# Patient Record
Sex: FEMALE | Race: WHITE | NOT HISPANIC OR LATINO
[De-identification: names, ages, dates, MRNs, and addresses within clinical notes are randomized per-mention and may not be internally consistent; named-entity substitution may affect disease eponyms.]

---

## 2018-07-11 ENCOUNTER — APPOINTMENT (OUTPATIENT)
Dept: VASCULAR SURGERY | Facility: CLINIC | Age: 83
End: 2018-07-11
Payer: MEDICARE

## 2018-07-11 VITALS — HEIGHT: 64 IN | BODY MASS INDEX: 23.22 KG/M2 | WEIGHT: 136 LBS

## 2018-07-11 DIAGNOSIS — Z78.9 OTHER SPECIFIED HEALTH STATUS: ICD-10-CM

## 2018-07-11 DIAGNOSIS — Z80.9 FAMILY HISTORY OF MALIGNANT NEOPLASM, UNSPECIFIED: ICD-10-CM

## 2018-07-11 DIAGNOSIS — Z86.39 PERSONAL HISTORY OF OTHER ENDOCRINE, NUTRITIONAL AND METABOLIC DISEASE: ICD-10-CM

## 2018-07-11 DIAGNOSIS — Z82.61 FAMILY HISTORY OF ARTHRITIS: ICD-10-CM

## 2018-07-11 PROCEDURE — 99203 OFFICE O/P NEW LOW 30 MIN: CPT

## 2018-07-11 PROCEDURE — 93971 EXTREMITY STUDY: CPT

## 2018-08-22 ENCOUNTER — APPOINTMENT (OUTPATIENT)
Dept: VASCULAR SURGERY | Facility: CLINIC | Age: 83
End: 2018-08-22
Payer: MEDICARE

## 2018-08-22 PROCEDURE — 99213 OFFICE O/P EST LOW 20 MIN: CPT

## 2019-03-06 ENCOUNTER — RECORD ABSTRACTING (OUTPATIENT)
Age: 84
End: 2019-03-06

## 2019-03-06 DIAGNOSIS — Z82.49 FAMILY HISTORY OF ISCHEMIC HEART DISEASE AND OTHER DISEASES OF THE CIRCULATORY SYSTEM: ICD-10-CM

## 2019-03-06 DIAGNOSIS — Z87.828 PERSONAL HISTORY OF OTHER (HEALED) PHYSICAL INJURY AND TRAUMA: ICD-10-CM

## 2019-03-06 DIAGNOSIS — M50.222 OTHER CERVICAL DISC DISPLACEMENT AT C5-C6 LEVEL: ICD-10-CM

## 2019-03-06 DIAGNOSIS — Z86.010 PERSONAL HISTORY OF COLONIC POLYPS: ICD-10-CM

## 2019-03-06 LAB — CYTOLOGY CVX/VAG DOC THIN PREP: NORMAL

## 2019-03-06 RX ORDER — ASPIRIN 325 MG/1
TABLET, FILM COATED ORAL
Refills: 0 | Status: ACTIVE | COMMUNITY

## 2019-04-16 ENCOUNTER — APPOINTMENT (OUTPATIENT)
Dept: CARDIOLOGY | Facility: CLINIC | Age: 84
End: 2019-04-16

## 2019-06-24 DIAGNOSIS — K21.9 GASTRO-ESOPHAGEAL REFLUX DISEASE W/OUT ESOPHAGITIS: ICD-10-CM

## 2019-06-24 DIAGNOSIS — K57.30 DIVERTICULOSIS OF LARGE INTESTINE W/OUT PERFORATION OR ABSCESS W/OUT BLEEDING: ICD-10-CM

## 2019-06-24 DIAGNOSIS — K29.30 CHRONIC SUPERFICIAL GASTRITIS W/OUT BLEEDING: ICD-10-CM

## 2019-06-24 DIAGNOSIS — E04.2 NONTOXIC MULTINODULAR GOITER: ICD-10-CM

## 2019-06-24 DIAGNOSIS — M54.12 RADICULOPATHY, CERVICAL REGION: ICD-10-CM

## 2019-06-24 DIAGNOSIS — K64.8 OTHER HEMORRHOIDS: ICD-10-CM

## 2019-06-24 DIAGNOSIS — G47.00 INSOMNIA, UNSPECIFIED: ICD-10-CM

## 2019-06-24 DIAGNOSIS — N81.10 CYSTOCELE, UNSPECIFIED: ICD-10-CM

## 2019-06-24 DIAGNOSIS — F41.1 GENERALIZED ANXIETY DISORDER: ICD-10-CM

## 2019-06-24 DIAGNOSIS — E07.9 DISORDER OF THYROID, UNSPECIFIED: ICD-10-CM

## 2019-06-24 DIAGNOSIS — M54.16 RADICULOPATHY, LUMBAR REGION: ICD-10-CM

## 2019-06-24 DIAGNOSIS — K76.89 OTHER SPECIFIED DISEASES OF LIVER: ICD-10-CM

## 2019-06-25 ENCOUNTER — APPOINTMENT (OUTPATIENT)
Dept: CARDIOLOGY | Facility: CLINIC | Age: 84
End: 2019-06-25
Payer: MEDICARE

## 2019-06-25 ENCOUNTER — NON-APPOINTMENT (OUTPATIENT)
Age: 84
End: 2019-06-25

## 2019-06-25 VITALS
DIASTOLIC BLOOD PRESSURE: 70 MMHG | SYSTOLIC BLOOD PRESSURE: 120 MMHG | HEIGHT: 72 IN | HEART RATE: 60 BPM | WEIGHT: 140 LBS | BODY MASS INDEX: 18.96 KG/M2

## 2019-06-25 DIAGNOSIS — I63.81 OTHER CEREBRAL INFARCTION DUE TO OCCLUSION OR STENOSIS OF SMALL ARTERY: ICD-10-CM

## 2019-06-25 DIAGNOSIS — I87.2 VENOUS INSUFFICIENCY (CHRONIC) (PERIPHERAL): ICD-10-CM

## 2019-06-25 DIAGNOSIS — R07.9 CHEST PAIN, UNSPECIFIED: ICD-10-CM

## 2019-06-25 DIAGNOSIS — R06.09 OTHER FORMS OF DYSPNEA: ICD-10-CM

## 2019-06-25 DIAGNOSIS — Z86.79 PERSONAL HISTORY OF OTHER DISEASES OF THE CIRCULATORY SYSTEM: ICD-10-CM

## 2019-06-25 DIAGNOSIS — Z87.898 PERSONAL HISTORY OF OTHER SPECIFIED CONDITIONS: ICD-10-CM

## 2019-06-25 DIAGNOSIS — E78.5 HYPERLIPIDEMIA, UNSPECIFIED: ICD-10-CM

## 2019-06-25 PROCEDURE — 99214 OFFICE O/P EST MOD 30 MIN: CPT

## 2019-06-25 PROCEDURE — 93000 ELECTROCARDIOGRAM COMPLETE: CPT

## 2019-06-25 NOTE — HISTORY OF PRESENT ILLNESS
[FreeTextEntry1] : Ms Burdick has been followed here since august 2018 for chest pain, a stress test was negative. There is a history of cva 1995 and  cerebellar lacunar infarcts in 2002. Since last visit she  has not been hospitalized, she has now developed sharp pains in her bones and muscles. She has slight  palpitations , no syncope.She complains that her varicosities are getting larger. She gets a discomfort under her left breast twice a week at rest usually after eating when she gets abdominal bloating.  She gets dyspnea with exertion, walks every night for 35 minutes by the end of the walk when it is hot she feels the shortness of breath.

## 2019-12-17 ENCOUNTER — APPOINTMENT (OUTPATIENT)
Dept: CARDIOLOGY | Facility: CLINIC | Age: 84
End: 2019-12-17

## 2020-10-10 ENCOUNTER — RESULT REVIEW (OUTPATIENT)
Age: 85
End: 2020-10-10

## 2020-10-12 ENCOUNTER — RESULT REVIEW (OUTPATIENT)
Age: 85
End: 2020-10-12

## 2021-05-28 ENCOUNTER — APPOINTMENT (OUTPATIENT)
Dept: GASTROENTEROLOGY | Facility: CLINIC | Age: 86
End: 2021-05-28
Payer: MEDICARE

## 2021-05-28 VITALS
BODY MASS INDEX: 23.32 KG/M2 | RESPIRATION RATE: 18 BRPM | TEMPERATURE: 97.5 F | HEIGHT: 65 IN | OXYGEN SATURATION: 97 % | DIASTOLIC BLOOD PRESSURE: 76 MMHG | HEART RATE: 76 BPM | SYSTOLIC BLOOD PRESSURE: 114 MMHG | WEIGHT: 140 LBS

## 2021-05-28 PROCEDURE — 99204 OFFICE O/P NEW MOD 45 MIN: CPT

## 2021-05-28 NOTE — HISTORY OF PRESENT ILLNESS
[FreeTextEntry1] : 85f HLD, HTN, anxiety, CVA '95 (no residua), here for 7d illness - diarrhea postprandially.  She felt upper abd pain w/ cramping as well 2d ago - went to ER:\par 5/26/21: CBC, CMET, lipase normal; CT oc/ic normal\par She still has symptoms- postprandial only - about 30-45 min after any meal.  No diarrhea at night.  3x/d.  No bleeding, fever, travel, recent abx.  Usually tends toward constipation.  No n/v.  \par \par 2013 Colonscopy - [facility] - change in BM, constipation - polyp \par 2014 egd - [facility] - abd pain - hiatal hernia, duodenitis, gastritis\par 2/2016 colonoscopy- [facility] -normal \par \par Soc:  no tobacco or significant EtOH\par FHx: no FHx GI malignancy or IBD\par \par ROS:\par Constitutional:: no weight loss, fevers\par ENT: no deafness\par Eyes: not blind\par Neck: no LN\par Chest: no dyspnea/cough\par Cardiac: no chest pain\par Vascular: no leg swelling\par GI: no abdominal pain, nausea, vomiting, diarrhea, constipation, rectal bleeding, dysphagia, melena unless otherwise noted in HPI\par : no dysuria, dark urine\par Skin: no rashes, jaundice\par Heme: no bleeding\par Endocrine: no DM unless otherwise stated in HPI\par \par Px: (VS noted below)\par General: NAD\par Eyes: anicteric\par Oropharynx:  clear\par Neck: no LN\par Chest: normal respiratory effort\par CVS: regular\par Abd: soft, NT, ND, +BS, no HSM\par Ext: no atrophy\par Neuro: grossly nonfocal\par \par Labs/imaging/prior endoscopic results reviewed to the extent available and noted in HPI\par

## 2021-05-28 NOTE — CONSULT LETTER
[FreeTextEntry1] : Dear Dr. SELENA BUSH ,\par \par I had the pleasure of evaluating your patient,  CANDACE BAE.\par \par Please refer to my note below.\par \par Thank you very much for allowing me to participate in the care of this patient.  If you have any questions, please do not hesitate to contact me.\par \par Sincerely, \par \par Humble Heath MD\par

## 2021-05-28 NOTE — ASSESSMENT
[FreeTextEntry1] : diarrhea - will send stool testing, but possibly infx/post-infx IBS, no bleeding or colitis on CT.  Liberalized diet, but emphasis on fluids and low lactose for now.  F/u 1 wk if sx persist.\par \par Colon cancer screening - colonoscopy due now, but at tail end of screening range.  Declined colonoscopy or cologuard.\par \par PMD/consultation/hospital notes and Labs/imaging/prior endoscopic results reviewed to extent noted in HPI; and, if procedure code billed on this visit for lab draw, this serves to signify that labs were drawn here in this office.\par \par \par

## 2021-05-28 NOTE — REASON FOR VISIT
[Consultation] : a consultation visit [FreeTextEntry1] : Kindly asked by Dr. Griffin to consult and evaluate patient for  diarrhea                  \par A copy of this note is being sent to physician requesting consultation.

## 2021-06-01 LAB
BACTERIA STL CULT: NORMAL
C DIFF TOX GENS STL QL NAA+PROBE: NORMAL
CDIFF BY PCR: NOT DETECTED

## 2021-06-02 LAB — DEPRECATED O AND P PREP STL: NORMAL

## 2021-06-03 ENCOUNTER — NON-APPOINTMENT (OUTPATIENT)
Age: 86
End: 2021-06-03

## 2021-06-03 ENCOUNTER — APPOINTMENT (OUTPATIENT)
Dept: GASTROENTEROLOGY | Facility: CLINIC | Age: 86
End: 2021-06-03
Payer: MEDICARE

## 2021-06-03 VITALS
TEMPERATURE: 96.8 F | OXYGEN SATURATION: 98 % | WEIGHT: 140 LBS | HEART RATE: 66 BPM | SYSTOLIC BLOOD PRESSURE: 118 MMHG | HEIGHT: 65 IN | DIASTOLIC BLOOD PRESSURE: 76 MMHG | RESPIRATION RATE: 18 BRPM | BODY MASS INDEX: 23.32 KG/M2

## 2021-06-03 DIAGNOSIS — R19.7 DIARRHEA, UNSPECIFIED: ICD-10-CM

## 2021-06-03 PROCEDURE — 99213 OFFICE O/P EST LOW 20 MIN: CPT

## 2021-06-03 NOTE — HISTORY OF PRESENT ILLNESS
[FreeTextEntry1] : 85f HLD, HTN, anxiety, CVA '95 (no residua), here forf/u - diarrhea postprandially.  She felt upper abd pain w/ cramping as well at onset - went to ER:\par 5/26/21: CBC, CMET, lipase normal; CT oc/ic normal\par \par BMs back to normal (constipation) - some tenesmus still.  Vague upper abd pain persists, but still waning.  No wt loss/n/v.  No fevers.  No BRBPR/melena.  No NSAID use.\par \par Stool studies normal - c.diff, op, cx.  \par \par Prior testing:\par 2013 Colonscopy - [facility] - change in BM, constipation - polyp \par 2014 egd - [facility] - abd pain - hiatal hernia, duodenitis, gastritis\par 2/2016 colonoscopy- [facility] -normal \par \par Soc:  no tobacco or significant EtOH\par FHx: no FHx GI malignancy or IBD\par \par ROS:\par Constitutional:: no weight loss, fevers\par ENT: no deafness\par Eyes: not blind\par Neck: no LN\par Chest: no dyspnea/cough\par Cardiac: no chest pain\par Vascular: no leg swelling\par GI: no abdominal pain, nausea, vomiting, diarrhea, constipation, rectal bleeding, dysphagia, melena unless otherwise noted in HPI\par : no dysuria, dark urine\par Skin: no rashes, jaundice\par Heme: no bleeding\par Endocrine: no DM unless otherwise stated in HPI\par \par Px: (VS noted below)\par General: NAD\par Eyes: anicteric\par Oropharynx:  clear\par Neck: no LN\par Chest: normal respiratory effort\par CVS: regular\par Abd: soft, NT, ND, +BS, no HSM\par Ext: no atrophy\par Neuro: grossly nonfocal\par SALLIE normal\par \par Labs/imaging/prior endoscopic results reviewed to the extent available and noted in HPI\par

## 2021-06-03 NOTE — ASSESSMENT
[FreeTextEntry1] : diarrhea -resolved\par \par -upper abd pain - negative w/u thus far, but continues to improve - advised EGD if sx don't resolve in next 1-2 wks. Cont PPI.\par \par -Colon cancer screening - declined colonoscopy - amenable to cologuard - ordered.  If colonoscopy done, would consider EGD/colonoscopy same time.\par \par PMD/consultation/hospital notes and Labs/imaging/prior endoscopic results reviewed to extent noted in HPI; and, if procedure code billed on this visit for lab draw, this serves to signify that labs were drawn here in this office.\par \par \par

## 2021-07-07 ENCOUNTER — APPOINTMENT (OUTPATIENT)
Dept: GASTROENTEROLOGY | Facility: CLINIC | Age: 86
End: 2021-07-07
Payer: MEDICARE

## 2021-07-07 VITALS
WEIGHT: 129 LBS | DIASTOLIC BLOOD PRESSURE: 75 MMHG | OXYGEN SATURATION: 97 % | SYSTOLIC BLOOD PRESSURE: 112 MMHG | HEART RATE: 66 BPM | BODY MASS INDEX: 21.49 KG/M2 | TEMPERATURE: 96.5 F | HEIGHT: 65 IN

## 2021-07-07 PROCEDURE — 99213 OFFICE O/P EST LOW 20 MIN: CPT

## 2021-07-07 NOTE — HISTORY OF PRESENT ILLNESS
[FreeTextEntry1] : 85f HLD, HTN, anxiety, CVA '95 (no residua), here forf/u - diarrhea postprandially.  She felt upper abd pain w/ cramping as well at onset - went to ER:\par 5/26/21: CBC, CMET, lipase normal; CT oc/ic normal\par -still w/ upper abdominal pain after lunch - persists - no improvement on antacids.  No n/v.\par -diarrhea resolved\par -refuses colonoscopy - had cologuard neg 6/2021\par \par 6/2021 labs at [facility] - normal cbc, cmet\par \par Stool studies normal - c.diff, op, cx.  \par \par Prior testing:\par 2013 Colonscopy - [facility] - change in BM, constipation - polyp \par 2014 egd - [facility] - abd pain - hiatal hernia, duodenitis, gastritis\par 2/2016 colonoscopy- [facility] -normal \par \par Soc:  no tobacco or significant EtOH\par FHx: no FHx GI malignancy or IBD\par \par ROS:\par Constitutional:: no weight loss, fevers\par ENT: no deafness\par Eyes: not blind\par Neck: no LN\par Chest: no dyspnea/cough\par Cardiac: no chest pain\par Vascular: no leg swelling\par GI: no abdominal pain, nausea, vomiting, diarrhea, constipation, rectal bleeding, dysphagia, melena unless otherwise noted in HPI\par : no dysuria, dark urine\par Skin: no rashes, jaundice\par Heme: no bleeding\par Endocrine: no DM unless otherwise stated in HPI\par \par Px: (VS noted below)\par General: NAD\par Eyes: anicteric\par Oropharynx:  clear\par Neck: no LN\par Chest: normal respiratory effort\par CVS: regular\par Abd: soft, NT, ND, +BS, no HSM\par Ext: no atrophy\par Neuro: grossly nonfocal\par SALLIE normal\par \par Labs/imaging/prior endoscopic results reviewed to the extent available and noted in HPI

## 2021-07-07 NOTE — ASSESSMENT
[FreeTextEntry1] : -upper abd pain - persisting despite neg w/u, ppi trial - plan EGD.\par \par -Colon cancer screening - declined colonoscopy - cologuard negative 2021\par \par PMD/consultation/hospital notes and Labs/imaging/prior endoscopic results reviewed to extent noted in HPI; and, if procedure code billed on this visit for lab draw, this serves to signify that labs were drawn here in this office.\par \par \par

## 2021-07-08 ENCOUNTER — NON-APPOINTMENT (OUTPATIENT)
Age: 86
End: 2021-07-08

## 2021-07-13 ENCOUNTER — APPOINTMENT (OUTPATIENT)
Dept: GASTROENTEROLOGY | Facility: HOSPITAL | Age: 86
End: 2021-07-13

## 2021-08-27 ENCOUNTER — APPOINTMENT (OUTPATIENT)
Dept: GASTROENTEROLOGY | Facility: CLINIC | Age: 86
End: 2021-08-27
Payer: MEDICARE

## 2021-08-27 VITALS
HEART RATE: 57 BPM | HEIGHT: 65 IN | DIASTOLIC BLOOD PRESSURE: 60 MMHG | OXYGEN SATURATION: 98 % | SYSTOLIC BLOOD PRESSURE: 120 MMHG | WEIGHT: 124 LBS | BODY MASS INDEX: 20.66 KG/M2 | TEMPERATURE: 95.3 F

## 2021-08-27 DIAGNOSIS — Z12.11 ENCOUNTER FOR SCREENING FOR MALIGNANT NEOPLASM OF COLON: ICD-10-CM

## 2021-08-27 DIAGNOSIS — R10.12 RIGHT UPPER QUADRANT PAIN: ICD-10-CM

## 2021-08-27 DIAGNOSIS — K52.9 NONINFECTIVE GASTROENTERITIS AND COLITIS, UNSPECIFIED: ICD-10-CM

## 2021-08-27 DIAGNOSIS — R10.11 RIGHT UPPER QUADRANT PAIN: ICD-10-CM

## 2021-08-27 PROCEDURE — 99214 OFFICE O/P EST MOD 30 MIN: CPT

## 2021-08-27 NOTE — HISTORY OF PRESENT ILLNESS
[FreeTextEntry1] : 85f HLD, HTN, anxiety, CVA '95 (no residua), here for f/u-\par \par -upper abd pain - now states improves w/ food (lunch) - no improvement w/ BM, antacids.  Plan was EGD, but pt cancelled in favor of prayer.  \par \par - diarrhea postprandially.  She attributes this to a stool softener she started for constipation.\par \par Initial eval: \par 5/2021 She felt upper abd pain w/ cramping as well at onset - went to ER:\par 5/26/21: CBC, CMET, lipase normal; CT oc/ic normal\par -refuses colonoscopy - had cologuard neg 6/2021\par \par 6/2021 labs at [facility] - normal cbc, cmet\par \par Stool studies normal - c.diff, op, cx.  \par \par Prior testing:\par 2013 Colonscopy - [facility] - change in BM, constipation - polyp \par 2014 egd - [facility] - abd pain - hiatal hernia, duodenitis, gastritis\par 2/2016 colonoscopy- [facility] -normal \par \par Soc:  no tobacco or significant EtOH\par FHx: no FHx GI malignancy or IBD\par \par ROS:\par Constitutional:: no weight loss, fevers\par ENT: no deafness\par Eyes: not blind\par Neck: no LN\par Chest: no dyspnea/cough\par Cardiac: no chest pain\par Vascular: no leg swelling\par GI: no abdominal pain, nausea, vomiting, diarrhea, constipation, rectal bleeding, dysphagia, melena unless otherwise noted in HPI\par : no dysuria, dark urine\par Skin: no rashes, jaundice\par Heme: no bleeding\par Endocrine: no DM unless otherwise stated in HPI\par \par Px: (VS noted below)\par General: NAD\par Eyes: anicteric\par Oropharynx:  clear\par Neck: no LN\par Chest: normal respiratory effort\par CVS: regular\par Abd: soft, NT, ND, +BS, no HSM\par Ext: no atrophy\par Neuro: grossly nonfocal\par SALLIE normal last visit\par \par Labs/imaging/prior endoscopic results reviewed to the extent available and noted in HPI

## 2021-08-27 NOTE — ASSESSMENT
[FreeTextEntry1] : -upper abd pain - persisting despite neg w/u, ppi trial - plan EGD.  Wants to go forward w/ EGD.\par \par -Colon cancer screening - declined colonoscopy - cologuard negative 2021\par \par -postprandial diarrhea - d/c stool softener in favor of fiber supplement.   Refused colonoscopy.\par \par PMD/consultation/hospital notes and Labs/imaging/prior endoscopic results reviewed to extent noted in HPI; and, if procedure code billed on this visit for lab draw, this serves to signify that labs were drawn here in this office.\par \par \par

## 2023-02-12 ENCOUNTER — RESULT REVIEW (OUTPATIENT)
Age: 88
End: 2023-02-12

## 2023-02-13 ENCOUNTER — TRANSCRIPTION ENCOUNTER (OUTPATIENT)
Age: 88
End: 2023-02-13

## 2023-02-14 ENCOUNTER — TRANSCRIPTION ENCOUNTER (OUTPATIENT)
Age: 88
End: 2023-02-14

## 2023-02-23 ENCOUNTER — APPOINTMENT (OUTPATIENT)
Dept: PAIN MANAGEMENT | Facility: CLINIC | Age: 88
End: 2023-02-23
Payer: MEDICARE

## 2023-02-23 VITALS
HEIGHT: 65 IN | DIASTOLIC BLOOD PRESSURE: 77 MMHG | BODY MASS INDEX: 19.49 KG/M2 | HEART RATE: 76 BPM | OXYGEN SATURATION: 95 % | WEIGHT: 117 LBS | SYSTOLIC BLOOD PRESSURE: 152 MMHG

## 2023-02-23 DIAGNOSIS — M80.08XG AGE-RELATED OSTEOPOROSIS WITH CURRENT PATHOLOGICAL FRACTURE, VERTEBRA(E), SUBSEQUENT ENCOUNTER FOR FRACTURE WITH DELAYED HEALING: ICD-10-CM

## 2023-02-23 PROCEDURE — 99213 OFFICE O/P EST LOW 20 MIN: CPT

## 2023-02-24 NOTE — ASSESSMENT
[FreeTextEntry1] : 87 yof doing very well after T12 and L1 kyphoplasty. \par \par Refer for osteoporosis management.\par \par Gabapentin 100 mg QHS.\par \par Physical therapy prescribed - goal will be to increase ROM, strengthening, postural training, other modalities ad hao which may include massage and stim. Goals of therapy discussed with the patient in detail and will be discussed with physical therapist. Patient will follow-up following course of physical therapy to monitor progress and adjust therapy as needed.\par \par Acetaminophen 1,000 mg q8h prn for moderate pain. Risks, benefits, and alternatives of acetaminophen discussed with patient.\par \par Ibuprofen 600 mg q8h prn add when pain is not adequately controlled with acetaminophen. Risks, benefits, and alternatives of ibuprofen discussed with patient.\par \par Diet and nutritional strategies discussed which may improve patients pain and will improve overall health.\par \par RTC one month.

## 2023-02-24 NOTE — PHYSICAL EXAM

## 2023-02-24 NOTE — HISTORY OF PRESENT ILLNESS
[FreeTextEntry1] : 87 yof presents for follow-up after T12 and L1 kyphoplasty with excellent results. She feels significantly better. Quality of life is improved. No new changes in health. Quality of life is impaired. WIshes to start physical therapy. \par \par

## 2023-06-04 ENCOUNTER — RX RENEWAL (OUTPATIENT)
Age: 88
End: 2023-06-04

## 2023-06-19 ENCOUNTER — APPOINTMENT (OUTPATIENT)
Dept: PAIN MANAGEMENT | Facility: CLINIC | Age: 88
End: 2023-06-19
Payer: MEDICARE

## 2023-06-19 VITALS
BODY MASS INDEX: 19.49 KG/M2 | DIASTOLIC BLOOD PRESSURE: 92 MMHG | HEIGHT: 65 IN | WEIGHT: 117 LBS | SYSTOLIC BLOOD PRESSURE: 193 MMHG

## 2023-06-19 PROCEDURE — 99214 OFFICE O/P EST MOD 30 MIN: CPT

## 2023-06-19 NOTE — PHYSICAL EXAM

## 2023-06-19 NOTE — ASSESSMENT
[FreeTextEntry1] : 87 yof doing very well after T12 and L1 kyphoplasty but with some low back and leg pain.\par \par I have personally reviewed the patient's MRI in detail and discussed it with them which is significant for severe stenosis at L4-L5.\par \par Refer for osteoporosis management.\par \par Gabapentin 100 mg QHS.\par \par Physical therapy prescribed - goal will be to increase ROM, strengthening, postural training, other modalities ad hao which may include massage and stim. Goals of therapy discussed with the patient in detail and will be discussed with physical therapist. Patient will follow-up following course of physical therapy to monitor progress and adjust therapy as needed.\par \par Acetaminophen 1,000 mg q8h prn for moderate pain. Risks, benefits, and alternatives of acetaminophen discussed with patient.\par \par Ibuprofen 600 mg q8h prn add when pain is not adequately controlled with acetaminophen. Risks, benefits, and alternatives of ibuprofen discussed with patient.\par \par Diet and nutritional strategies discussed which may improve patients pain and will improve overall health.\par \par RTC one month.

## 2023-06-19 NOTE — DATA REVIEWED
[FreeTextEntry1] : EXAM: MR thoracic spine without contrast, MR lumbar spine without contrast \par \par  INDICATION: Thoracic and lumbar back pain \par \par  TECHNIQUE: \par \par  MR examination of thoracic spine performed without contrast utilizing sagittal T2, sagittal T1, \par sagittal T2 STIR, axial T1, axial T2 sequences. \par \par  MR examination of lumbar spine performed without contrast utilizing sagittal T2, sagittal T1, \par sagittal T2 STIR, axial T1, axial T2 and axial gradient echo sequences. \par \par  COMPARISON: February 9, 2023 CT Lumbar spine \par \par  FINDINGS: \par \par  Thoracic: \par \par \par  There is redemonstration of moderate to severe loss of height of the T12 vertebral body with \par associated marrow edema compatible with recent compression fracture there is minimal associated \par osseous retropulsion without significant canal stenosis. \par \par  Thoracic vertebral body heights are otherwise maintained. Thoracic vertebral body bone marrow \par signal within normal limits. No suspicious expansile or destructive osseous lesion is identified. No\par abnormal cord signal identified. No significant periarticular or paraspinal soft tissue edema \par identified. Paraspinal soft tissues are within normal limits. There are small disc bulge/protrusion \par throughout the thoracic spine without significant canal or foraminal stenosis. \par \par \par \par \par  Lumbar: \par \par \par  Lumbar lordosis is maintained. Mild L4-L5 anterolisthesis, L5-S1 anterolisthesis. Redemonstration \par of mild loss of height of the L1 superior endplate with associated marrow edema compatible with \par recent compression fracture. There is associated minimal osseous retropulsion without significant \par canal stenosis. Vertebral body heights are otherwise maintained. There are multilevel degenerative \par endplate changes with osteophyte formation, intervertebral disc space narrowing/desiccation, \par Schmorl's nodes and endplate irregularity. limits. No abnormal cord signal identified. Conus \par terminates at L1-L2. No significant periarticular or paraspinal soft tissue edema is identified. No \par suspicious expansile or destructive osseous lesion identified. Paraspinal soft tissues are \par unremarkable. There are partially imaged T2 hyperintense lesions within the kidneys and liver, \par nonspecific incompletely characterized may reflect benign cystic lesions. \par \par  There are multilevel findings as follows: \par \par  T12-L1: No significant canal or foraminal stenosis. \par \par  L1-L2: No significant canal or foraminal stenosis. \par \par  L2-L3: Disc bulge, bilateral facet arthropathy, ligamentous hypertrophy contributing to no \par significant canal stenosis and mild bilateral foraminal stenosis. \par \par  L3-L4: Disc bulge, bilateral facet arthropathy, ligamentous hypertrophy contributing to mild canal \par stenosis and mild bilateral foraminal stenosis. Mild to moderate bilateral lateral recess stenosis. \par \par  L4-L5: Disc bulge, bilateral facet arthropathy, ligamentous hypertrophy contributing to severe \par canal stenosis and moderate bilateral foraminal stenosis. Severe bilateral lateral recess stenosis. \par \par  L5-S1: Disc bulge, bilateral facet arthropathy, ligamentous hypertrophy contributing to no \par significant canal stenosis and mild bilateral foraminal stenosis. \par \par \par \par \par \par  IMPRESSION: \par \par  Redemonstration of recent compression fractures of T12 and L1 with minimal osseous retropulsion \par without significant associated canal stenosis corresponding to findings described to February 9, \par 2023 CT lumbar spine. \par \par \par  No significant canal foraminal stenosis of the thoracic spine. \par  Multilevel lumbar spondylitic changes as detailed above most notable at the L4-L5 level where \par there is severe canal stenosis, moderate bilateral foraminal stenosis, involving exiting L4 nerve \par roots and severe bilateral lateral recess stenosis, involving descending L5 nerve roots. \par \par

## 2023-06-19 NOTE — HISTORY OF PRESENT ILLNESS
[2] : 3. What number best describes how, during the past week, pain has interfered with your general activity? 2/10 pain [FreeTextEntry1] : Interval History:\par Patient returns for follow-up. She had excellent relief from her MAR. Did not go to physical therapy afterward. Having some low back discomfort and radiating pain down the leg, wishes to address. Quality of life is impaired. There has been a severe exacerbation of the patient's chronic pain.\par \par HPI: 87 yof presents for follow-up after T12 and L1 kyphoplasty with excellent results. She feels significantly better. Quality of life is improved. No new changes in health. Quality of life is impaired. WIshes to start physical therapy. \par \par Interventions:\par T12 and L1 kyphoplasty\par \par  [FreeTextEntry2] : 6

## 2023-06-28 ENCOUNTER — RESULT REVIEW (OUTPATIENT)
Age: 88
End: 2023-06-28

## 2023-07-05 ENCOUNTER — APPOINTMENT (OUTPATIENT)
Dept: PAIN MANAGEMENT | Facility: CLINIC | Age: 88
End: 2023-07-05

## 2023-09-05 ENCOUNTER — RX RENEWAL (OUTPATIENT)
Age: 88
End: 2023-09-05

## 2023-09-18 ENCOUNTER — APPOINTMENT (OUTPATIENT)
Dept: PAIN MANAGEMENT | Facility: CLINIC | Age: 88
End: 2023-09-18
Payer: MEDICARE

## 2023-09-18 VITALS
HEIGHT: 65 IN | DIASTOLIC BLOOD PRESSURE: 69 MMHG | SYSTOLIC BLOOD PRESSURE: 129 MMHG | WEIGHT: 117 LBS | BODY MASS INDEX: 19.49 KG/M2

## 2023-09-18 PROCEDURE — 99214 OFFICE O/P EST MOD 30 MIN: CPT

## 2023-09-27 ENCOUNTER — RESULT REVIEW (OUTPATIENT)
Age: 88
End: 2023-09-27

## 2023-10-01 PROBLEM — I63.81 LACUNAR INFARCTION: Status: ACTIVE | Noted: 2019-03-06

## 2023-10-12 ENCOUNTER — APPOINTMENT (OUTPATIENT)
Dept: PAIN MANAGEMENT | Facility: CLINIC | Age: 88
End: 2023-10-12

## 2023-10-16 ENCOUNTER — APPOINTMENT (OUTPATIENT)
Dept: PAIN MANAGEMENT | Facility: CLINIC | Age: 88
End: 2023-10-16
Payer: MEDICARE

## 2023-10-16 VITALS
HEIGHT: 65 IN | DIASTOLIC BLOOD PRESSURE: 92 MMHG | SYSTOLIC BLOOD PRESSURE: 196 MMHG | BODY MASS INDEX: 19.49 KG/M2 | WEIGHT: 117 LBS

## 2023-10-16 DIAGNOSIS — G89.4 CHRONIC PAIN SYNDROME: ICD-10-CM

## 2023-10-16 DIAGNOSIS — M54.12 RADICULOPATHY, CERVICAL REGION: ICD-10-CM

## 2023-10-16 PROCEDURE — 99214 OFFICE O/P EST MOD 30 MIN: CPT

## 2023-12-04 ENCOUNTER — RX RENEWAL (OUTPATIENT)
Age: 88
End: 2023-12-04

## 2023-12-18 ENCOUNTER — APPOINTMENT (OUTPATIENT)
Dept: PAIN MANAGEMENT | Facility: CLINIC | Age: 88
End: 2023-12-18

## 2024-03-06 ENCOUNTER — RX RENEWAL (OUTPATIENT)
Age: 89
End: 2024-03-06

## 2024-03-06 RX ORDER — GABAPENTIN 100 MG/1
100 CAPSULE ORAL
Qty: 30 | Refills: 2 | Status: ACTIVE | COMMUNITY
Start: 2023-02-23 | End: 1900-01-01

## 2024-03-12 ENCOUNTER — APPOINTMENT (OUTPATIENT)
Dept: GERIATRICS | Facility: CLINIC | Age: 89
End: 2024-03-12
Payer: MEDICARE

## 2024-03-12 VITALS
SYSTOLIC BLOOD PRESSURE: 128 MMHG | BODY MASS INDEX: 21.3 KG/M2 | HEART RATE: 87 BPM | OXYGEN SATURATION: 97 % | WEIGHT: 128 LBS | DIASTOLIC BLOOD PRESSURE: 72 MMHG

## 2024-03-12 DIAGNOSIS — J34.89 OTHER SPECIFIED DISORDERS OF NOSE AND NASAL SINUSES: ICD-10-CM

## 2024-03-12 DIAGNOSIS — Z01.89 ENCOUNTER FOR OTHER SPECIFIED SPECIAL EXAMINATIONS: ICD-10-CM

## 2024-03-12 DIAGNOSIS — M79.89 OTHER SPECIFIED SOFT TISSUE DISORDERS: ICD-10-CM

## 2024-03-12 DIAGNOSIS — Z13.220 ENCOUNTER FOR SCREENING FOR LIPOID DISORDERS: ICD-10-CM

## 2024-03-12 PROCEDURE — G2211 COMPLEX E/M VISIT ADD ON: CPT

## 2024-03-12 PROCEDURE — G0444 DEPRESSION SCREEN ANNUAL: CPT | Mod: 59

## 2024-03-12 PROCEDURE — 99204 OFFICE O/P NEW MOD 45 MIN: CPT

## 2024-03-12 RX ORDER — IPRATROPIUM BROMIDE 21 UG/1
0.03 SPRAY NASAL 3 TIMES DAILY
Qty: 1 | Refills: 2 | Status: ACTIVE | COMMUNITY
Start: 2024-03-12 | End: 1900-01-01

## 2024-03-12 RX ORDER — RABEPRAZOLE SODIUM 20 MG/1
20 TABLET, DELAYED RELEASE ORAL DAILY
Refills: 0 | Status: ACTIVE | COMMUNITY
Start: 2024-03-12

## 2024-03-12 NOTE — PHYSICAL EXAM
[No Respiratory Distress] : no respiratory distress [Respiration, Rhythm And Depth] : normal respiratory rhythm and effort [No Acc Muscle Use] : no accessory muscle use [Auscultation Breath Sounds / Voice Sounds] : lungs were clear to auscultation bilaterally [Normal S1, S2] : normal S1 and S2 [Heart Rate And Rhythm] : heart rate was normal and rhythm regular [de-identified] : Significant tenderness over right thenar eminence of hand as well as between her first digit and second digit.  Limited range of motion of right thumb.  No visible swelling of right thenar eminence.  Raised temperature over right hand compared to left.  No redness or right hand versus left.

## 2024-03-12 NOTE — HISTORY OF PRESENT ILLNESS
[0] : 1) Little interest or pleasure doing things: Not at all (0) [I have developed a follow-up plan documented below in the note.] : I have developed a follow-up plan documented below in the note. [PHQ-2 Negative - No further assessment needed] : PHQ-2 Negative - No further assessment needed [Little interest or pleasure doing things] : 1) Little interest or pleasure doing things [Feeling down, depressed, or hopeless] : 2) Feeling down, depressed, or hopeless [de-identified] : History of depression after loss of her son. [FreeTextEntry1] : CANDACE BAE is a 88 year yo White  female is coming in today to establish care. Patient has PMHx as listed below Provided previsit questionnaire and prior PCP documentation requisition form  she was handling the window and trying to close them. got her right arm skin tear. went to . got it bandaged. since then has pain in right thumb and dial surface pain with a lump in her palm. Pain going on for about week and half or 2 weeks.  making a fist makes it worse. She also paints   Fractured her lower back after falling and could not get up 2 yrs ago. Got spinal surgery. cant sit long as the pain comes back.  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 Immunization: Screening: Depression screening: Medication reconciliation: Allergies:     4M Geriatric Screening Tests   Based on The 4Ms While Caring for Older Adults, an evidence-based focus on the key areas of mentation, mobility, medications, and what matters most (a guide by the [employer] and the [employer])     Medications: -Anticholinergic burden:[ ]     Mobility:   -Chronic pain:[ ] -Constipation: -Urinary symptoms:    Frail Scale: 0/5   -Are you fatigued?[ ] -Can you walk up one flight of stairs?[ ] -Can you walk one block?[ ] -Do you have more than 5 illnesses?[ ] -Have you lost more than 5% of your weight in last 6 months?[ ]       Mentation: -Hx of dementia:[ ] -h/o delirium:[ ] -Cognitive enhancing agents:[ ]       Sleep: STOP-BANG Snoring:[ ] Tiredness:[ ] Observed Apnea:[ ] Pressure: High blood pressure[ ] BMI: higher than 35.[ ] Age: older than 50[ ] Neck Circumference: greater than 16 inches is considered a risk factor.[ ] Gender: Males[ ]     Matters Most

## 2024-03-13 LAB
ALBUMIN SERPL ELPH-MCNC: 4.1 G/DL
ALP BLD-CCNC: 70 U/L
ALT SERPL-CCNC: 12 U/L
ANION GAP SERPL CALC-SCNC: 13 MMOL/L
AST SERPL-CCNC: 17 U/L
BASOPHILS # BLD AUTO: 0.04 K/UL
BASOPHILS NFR BLD AUTO: 0.6 %
BILIRUB SERPL-MCNC: 0.6 MG/DL
BUN SERPL-MCNC: 13 MG/DL
CALCIUM SERPL-MCNC: 8.9 MG/DL
CHLORIDE SERPL-SCNC: 101 MMOL/L
CHOLEST SERPL-MCNC: 187 MG/DL
CO2 SERPL-SCNC: 24 MMOL/L
CREAT SERPL-MCNC: 0.66 MG/DL
EGFR: 84 ML/MIN/1.73M2
EOSINOPHIL # BLD AUTO: 0.07 K/UL
EOSINOPHIL NFR BLD AUTO: 1 %
GLUCOSE SERPL-MCNC: 91 MG/DL
HCT VFR BLD CALC: 42.4 %
HDLC SERPL-MCNC: 78 MG/DL
HGB BLD-MCNC: 13.7 G/DL
IMM GRANULOCYTES NFR BLD AUTO: 0.1 %
LDLC SERPL CALC-MCNC: 93 MG/DL
LYMPHOCYTES # BLD AUTO: 1.92 K/UL
LYMPHOCYTES NFR BLD AUTO: 26.6 %
MAN DIFF?: NORMAL
MCHC RBC-ENTMCNC: 31 PG
MCHC RBC-ENTMCNC: 32.3 GM/DL
MCV RBC AUTO: 95.9 FL
MONOCYTES # BLD AUTO: 0.68 K/UL
MONOCYTES NFR BLD AUTO: 9.4 %
NEUTROPHILS # BLD AUTO: 4.5 K/UL
NEUTROPHILS NFR BLD AUTO: 62.3 %
NONHDLC SERPL-MCNC: 108 MG/DL
PLATELET # BLD AUTO: 223 K/UL
POTASSIUM SERPL-SCNC: 4.2 MMOL/L
PROT SERPL-MCNC: 6.8 G/DL
RBC # BLD: 4.42 M/UL
RBC # FLD: 12.6 %
SODIUM SERPL-SCNC: 137 MMOL/L
TRIGL SERPL-MCNC: 83 MG/DL
TSH SERPL-ACNC: 2.09 UIU/ML
WBC # FLD AUTO: 7.22 K/UL

## 2024-03-14 ENCOUNTER — APPOINTMENT (OUTPATIENT)
Dept: PAIN MANAGEMENT | Facility: CLINIC | Age: 89
End: 2024-03-14
Payer: MEDICARE

## 2024-03-14 VITALS
BODY MASS INDEX: 21.33 KG/M2 | WEIGHT: 128 LBS | DIASTOLIC BLOOD PRESSURE: 92 MMHG | SYSTOLIC BLOOD PRESSURE: 157 MMHG | OXYGEN SATURATION: 95 % | HEIGHT: 65 IN | HEART RATE: 64 BPM

## 2024-03-14 DIAGNOSIS — M54.16 RADICULOPATHY, LUMBAR REGION: ICD-10-CM

## 2024-03-14 DIAGNOSIS — M79.10 MYALGIA, UNSPECIFIED SITE: ICD-10-CM

## 2024-03-14 PROCEDURE — G2211 COMPLEX E/M VISIT ADD ON: CPT

## 2024-03-14 PROCEDURE — 99214 OFFICE O/P EST MOD 30 MIN: CPT

## 2024-03-14 NOTE — DATA REVIEWED
[FreeTextEntry1] :  xam Date:      09/27/23 Exam:         MRI CERVICAL SPINE   FINDINGS:   There is normal cervical lordosis. No subluxation. Vertebral body heights are maintained. Bone marrow signal is unremarkable. No prevertebral soft tissue swelling.   The cervical cord is normal in size and signal characteristics. Visualized portion of the posterior fossa is unremarkable.   C2-C3 level: Bilateral facet arthropathy (most pronounced and moderate on the right) and small posterior disc bulge with small protrusion result in mild to moderate right neural foraminal narrowing but no significant left neural foraminal narrowing or central canal narrowing.   C3-C4 level: Moderate bilateral facet arthropathy and small posterior disc bulge with superimposed right paracentral protrusion result in mild left neural foraminal narrowing, moderate right neural foraminal narrowing but no significant central canal narrowing.   C4-C5 level:  Broad-based posterior disc bulge with superimposed left paracentral protrusion in conjunction with moderate bilateral facet arthropathy results in mild right neural foraminal narr owing, moderate left neural foraminal narrowing but no significant central canal narrowing.   C5-C6 level: Broad-based posterior disc bulge with superimposed central/left paracentral protrusion in conjunction with moderate bilateral facet arthropathy results in moderate left neural foraminal narrowing, mild to moderate right neural foraminal narrowing and mild to moderate central canal narrowing.   C6-C7 level: Broad-based posterior disc bulge and mild bilateral facet arthropathy result in mild to moderate bilateral neural foraminal narrowing but no significant central canal narrowing.   C7-T1 level: Broad-based posterior disc bulge with superimposed right intraforaminal protrusion as well as mild lateral facet arthropathy results in mild right neural foraminal narrowing but no significant left neural foraminal narrowing or central canal narrowing.   In the visualized upper thoracic spine there there are small disc bulges at T1-T2 and T2-T3 without significant central canal or neural foraminal narrowing.    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 Additional varying degrees of central canal and neural foraminal narrowing, as above.  EXAM: MR thoracic spine without contrast, MR lumbar spine without contrast    FINDINGS:    Thoracic:     There is redemonstration of moderate to severe loss of height of the T12 vertebral body with  associated marrow edema compatible with recent compression fracture there is minimal associated  osseous retropulsion without significant canal stenosis.    Thoracic vertebral body heights are otherwise maintained. Thoracic vertebral body bone marrow  signal within normal limits. No suspicious expansile or destructive osseous lesion is identified. No abnormal cord signal identified. No significant periarticular or paraspinal soft tissue edema  identified. Paraspinal soft tissues are within normal limits. There are small disc bulge/protrusion  throughout the thoracic spine without significant canal or foraminal stenosis.       Lumbar:     Lumbar lordosis is maintained. Mild L4-L5 anterolisthesis, L5-S1 anterolisthesis. Redemonstration  of mild loss of height of the L1 superior endplate with associated marrow edema compatible with  recent compression fracture. There is associated minimal osseous retropulsion without significant  canal stenosis. Vertebral body heights are otherwise maintained. There are multilevel degenerative  endplate changes with osteophyte formation, intervertebral disc space narrowing/desiccation,  Schmorl's nodes and endplate irregularity. limits. No abnormal cord signal identified. Conus  terminates at L1-L2. No significant periarticular or paraspinal soft tissue edema is identified. No  suspicious expansile or destructive osseous lesion identified. Paraspinal soft tissues are  unremarkable. There are partially imaged T2 hyperintense lesions within the kidneys and liver,  nonspecific incompletely characterized may reflect benign cystic lesions.    There are multilevel findings as follows:    T12-L1: No significant canal or foraminal stenosis.    L1-L2: No significant canal or foraminal stenosis.    L2-L3: Disc bulge, bilateral facet arthropathy, ligamentous hypertrophy contributing to no  significant canal stenosis and mild bilateral foraminal stenosis.    L3-L4: Disc bulge, bilateral facet arthropathy, ligamentous hypertrophy contributing to mild canal  stenosis and mild bilateral foraminal stenosis. Mild to moderate bilateral lateral recess stenosis.    L4-L5: Disc bulge, bilateral facet arthropathy, ligamentous hypertrophy contributing to severe  canal stenosis and moderate bilateral foraminal stenosis. Severe bilateral lateral recess stenosis.    L5-S1: Disc bulge, bilateral facet arthropathy, ligamentous hypertrophy contributing to no  significant canal stenosis and mild bilateral foraminal stenosis.        IMPRESSION:    Redemonstration of recent compression fractures of T12 and L1 with minimal osseous retropulsion  without significant associated canal stenosis corresponding to findings described to February 9, 2023 CT lumbar spine.     No significant canal foraminal stenosis of the thoracic spine.   Multilevel lumbar spondylitic changes as detailed above most notable at the L4-L5 level where  there is severe canal stenosis, moderate bilateral foraminal stenosis, involving exiting L4 nerve  roots and severe bilateral lateral recess stenosis, involving descending L5 nerve roots.

## 2024-03-14 NOTE — PHYSICAL EXAM
[de-identified] : Constitutional: Well-developed, in no acute distress  Musculoskeletal: Cervical Spine:    Gait: Antalgic Inspection: Normal curvature, no abnormal kyphosis or scoliosis  Facet loading: pain bilaterally  Palpation: Cervical paraspinal muscles: pain bilaterally 		 Muscle Strength: Deltoid: 5/5 bilaterally Biceps: 5/5 bilaterally Triceps: 5/5 bilaterally Adductor pollicis: 5/5 bilaterally  Sensation: normal and equal in bilateral upper extremities  Extremity: no edema noted Neurological: Memory normal, AAO x 3, Cranial nerves II - XII grossly normal Psychiatric: Appropriate mood and affect, oriented to time, place, person, and situation

## 2024-03-14 NOTE — ASSESSMENT
[FreeTextEntry1] : 87 yof here to review MRI with neck and arm pain.  I have personally reviewed the patient's MRI in detail and discussed it with them which is significant for multiple levels of significant foraminal stenosis.  Physical therapy prescribed - goal will be to increase ROM, strengthening, postural training, other modalities ad hao which may include massage and stim. Goals of therapy discussed with the patient in detail and will be discussed with physical therapist. Patient will follow-up following course of physical therapy to monitor progress and adjust therapy as needed.  Acetaminophen 1,000 mg q8h prn for moderate pain. Risks, benefits, and alternatives of acetaminophen discussed with patient.  Ibuprofen 600 mg q8h prn add when pain is not adequately controlled with acetaminophen. Risks, benefits, and alternatives of ibuprofen discussed with patient.  Diet and nutritional strategies discussed which may improve patients pain and will improve overall health.  If no relief plan for MAR.

## 2024-03-14 NOTE — HISTORY OF PRESENT ILLNESS
[2] : 3. What number best describes how, during the past week, pain has interfered with your general activity? 2/10 pain [FreeTextEntry1] : Interval History: Patient returns for follow-up. She had excellent relief from her MAR. Quality of life is impaired. There has been a severe exacerbation of the patient's chronic pain. There is cracking in her neck. PT is helping tremendously.  HPI: 87 yof presents for follow-up after T12 and L1 kyphoplasty with excellent results. She feels significantly better. Quality of life is improved. No new changes in health. Quality of life is impaired. WIshes to start physical therapy.   Interventions: T12 and L1 kyphoplasty   [FreeTextEntry2] : 6

## 2024-03-28 RX ORDER — SERTRALINE HYDROCHLORIDE 50 MG/1
50 TABLET, FILM COATED ORAL DAILY
Qty: 30 | Refills: 1 | Status: ACTIVE | COMMUNITY

## 2024-04-12 ENCOUNTER — APPOINTMENT (OUTPATIENT)
Dept: GERIATRICS | Facility: CLINIC | Age: 89
End: 2024-04-12
Payer: MEDICARE

## 2024-04-12 VITALS
DIASTOLIC BLOOD PRESSURE: 80 MMHG | WEIGHT: 127 LBS | SYSTOLIC BLOOD PRESSURE: 172 MMHG | OXYGEN SATURATION: 95 % | BODY MASS INDEX: 21.13 KG/M2 | TEMPERATURE: 98.2 F | HEART RATE: 60 BPM

## 2024-04-12 DIAGNOSIS — M81.0 AGE-RELATED OSTEOPOROSIS W/OUT CURRENT PATHOLOGICAL FRACTURE: ICD-10-CM

## 2024-04-12 DIAGNOSIS — I83.93 ASYMPTOMATIC VARICOSE VEINS OF BILATERAL LOWER EXTREMITIES: ICD-10-CM

## 2024-04-12 DIAGNOSIS — R15.9 FULL INCONTINENCE OF FECES: ICD-10-CM

## 2024-04-12 DIAGNOSIS — F32.A DEPRESSION, UNSPECIFIED: ICD-10-CM

## 2024-04-12 PROCEDURE — 99215 OFFICE O/P EST HI 40 MIN: CPT

## 2024-04-12 PROCEDURE — G2211 COMPLEX E/M VISIT ADD ON: CPT

## 2024-04-12 RX ORDER — CEFADROXIL 500 MG/1
500 CAPSULE ORAL TWICE DAILY
Qty: 10 | Refills: 0 | Status: COMPLETED | COMMUNITY
Start: 2024-03-12 | End: 2024-04-12

## 2024-04-12 NOTE — HISTORY OF PRESENT ILLNESS
[Patient reported osteoporosis screening was abnormal] : Patient reported osteoporosis screening was abnormal [Patient reported hearing was abnormal] : Patient reported hearing was abnormal [Patient reported colon/rectal/cancer screening was normal] : Patient reported colon/rectal cancer screening was normal [Patient reported breast cancer screening was normal] : Patient reported breast cancer screening was normal [Patient reported cervical cancer screening was normal] : Patient reported cervical cancer screening was normal [No falls in past year] : Patient reported no falls in the past year [FreeTextEntry1] : Swelling of right thumb much improved off antibiotics.   #Osteoporosis on prolia last scan 2023  #Cervical radiculopathy On gabapentin 100 mg nightly  #GERD on rabeprazole 20mg QD  Generalized anxiety disorder with insomnia On Zoloft 75 mg daily.  Wean herself down to 50 mg.  #Lacunar infarcts ,  On aspirin, Lipitor 40 mg nightly  #Lower extremity venous insufficiency Chronic  2024 CANDACE BAE is a 88 year yo White  female is coming in today to establish care. Patient has PMHx as listed below Provided previsit questionnaire and prior PCP documentation requisition form  she was handling the window and trying to close them. got her right arm skin tear. went to . got it bandaged. since then has pain in right thumb and dial surface pain with a lump in her palm. Pain going on for about week and half or 2 weeks.  making a fist makes it worse. She also paints   Fractured her lower back after falling and could not get up 2 yrs ago. Got spinal surgery. cant sit long as the pain comes back.  Nose drips constantly,   #Cervical radiculopathy On gabapentin 100 mg nightly  #GERD on rabeprazole 20mg QD  Generalized anxiety disorder with insomnia On Zoloft 50mg QD  #Lacunar infarcts ,  On aspirin, Lipitor 40 mg nightly  #Lower extremity venous insufficiency         Education: Middle of doctorate psychology Work: teacher, retired ETOH/Smoking: No alcohol; Smoked 15 till 25yr old Weight loss/malnutrition: 128lb today : , 2016, 2 daughter, 1 grandkid; her son ; was major in [employer]. Immunization: 3 covid shots, TDAP within 10 yrs. allergic to PCV, got shingles vaccine Screening: completed Depression screening: done Medication reconciliation: done Allergies: updatesd; bee sting     4M Geriatric Screening Tests   Based on The 4Ms While Caring for Older Adults, an evidence-based focus on the key areas of mentation, mobility, medications, and what matters most (a guide by the [employer] and the [employer])     Medications: -Anticholinergic burden:[ ]     Mobility:   -Chronic pain:  -Constipation: has to go to the bathroom as soon as she eats. since 2 yrs. does not move BM unless she urinate. has hemorrhoids -Urinary symptoms: no    Frail Scale: 0/5   -Are you fatigued?[ ] -Can you walk up one flight of stairs?[ ] -Can you walk one block?[ ] -Do you have more than 5 illnesses?[ ] -Have you lost more than 5% of your weight in last 6 months?[ ]       Mentation: -Hx of dementia:[ ] -h/o delirium:[ ] -Cognitive enhancing agents:[ ]       Sleep: STOP-BANG Snoring:[ ] Tiredness:[ ] Observed Apnea:[ ] Pressure: High blood pressure[ ] BMI: higher than 35.[ ] Age: older than 50[ ] Neck Circumference: greater than 16 inches is considered a risk factor.[ ] Gender: Males[ ]     Matters Most     [BreastSonogramDate] : 04/2023 [TextBox_25] : osteoporosis on prolia [FreeTextEntry4] : will not want to get the manymore. bad problem with anesthesia.

## 2024-06-12 ENCOUNTER — APPOINTMENT (OUTPATIENT)
Dept: GERIATRICS | Facility: CLINIC | Age: 89
End: 2024-06-12

## 2025-02-12 ENCOUNTER — APPOINTMENT (OUTPATIENT)
Dept: PAIN MANAGEMENT | Facility: CLINIC | Age: 89
End: 2025-02-12
Payer: MEDICARE

## 2025-02-12 VITALS
SYSTOLIC BLOOD PRESSURE: 172 MMHG | BODY MASS INDEX: 19.99 KG/M2 | HEART RATE: 66 BPM | DIASTOLIC BLOOD PRESSURE: 95 MMHG | OXYGEN SATURATION: 98 % | WEIGHT: 120 LBS | HEIGHT: 65 IN

## 2025-02-12 DIAGNOSIS — G89.4 CHRONIC PAIN SYNDROME: ICD-10-CM

## 2025-02-12 DIAGNOSIS — M48.02 SPINAL STENOSIS, CERVICAL REGION: ICD-10-CM

## 2025-02-12 DIAGNOSIS — M79.10 MYALGIA, UNSPECIFIED SITE: ICD-10-CM

## 2025-02-12 DIAGNOSIS — M54.12 RADICULOPATHY, CERVICAL REGION: ICD-10-CM

## 2025-02-12 DIAGNOSIS — M81.0 AGE-RELATED OSTEOPOROSIS W/OUT CURRENT PATHOLOGICAL FRACTURE: ICD-10-CM

## 2025-02-12 DIAGNOSIS — M54.16 RADICULOPATHY, LUMBAR REGION: ICD-10-CM

## 2025-02-12 DIAGNOSIS — M47.812 SPONDYLOSIS W/OUT MYELOPATHY OR RADICULOPATHY, CERVICAL REGION: ICD-10-CM

## 2025-02-12 PROCEDURE — G2211 COMPLEX E/M VISIT ADD ON: CPT

## 2025-02-12 PROCEDURE — 99214 OFFICE O/P EST MOD 30 MIN: CPT

## 2025-02-13 ENCOUNTER — APPOINTMENT (OUTPATIENT)
Dept: PAIN MANAGEMENT | Facility: CLINIC | Age: 89
End: 2025-02-13

## 2025-02-19 ENCOUNTER — APPOINTMENT (OUTPATIENT)
Dept: PAIN MANAGEMENT | Facility: CLINIC | Age: 89
End: 2025-02-19

## 2025-02-22 ENCOUNTER — RESULT REVIEW (OUTPATIENT)
Age: 89
End: 2025-02-22

## 2025-03-03 ENCOUNTER — APPOINTMENT (OUTPATIENT)
Dept: PAIN MANAGEMENT | Facility: CLINIC | Age: 89
End: 2025-03-03
Payer: MEDICARE

## 2025-03-03 VITALS
HEIGHT: 65 IN | DIASTOLIC BLOOD PRESSURE: 73 MMHG | SYSTOLIC BLOOD PRESSURE: 169 MMHG | BODY MASS INDEX: 19.99 KG/M2 | WEIGHT: 120 LBS

## 2025-03-03 DIAGNOSIS — M54.16 RADICULOPATHY, LUMBAR REGION: ICD-10-CM

## 2025-03-03 DIAGNOSIS — M54.12 RADICULOPATHY, CERVICAL REGION: ICD-10-CM

## 2025-03-03 DIAGNOSIS — M47.812 SPONDYLOSIS W/OUT MYELOPATHY OR RADICULOPATHY, CERVICAL REGION: ICD-10-CM

## 2025-03-03 DIAGNOSIS — M79.10 MYALGIA, UNSPECIFIED SITE: ICD-10-CM

## 2025-03-03 DIAGNOSIS — G89.4 CHRONIC PAIN SYNDROME: ICD-10-CM

## 2025-03-03 PROCEDURE — 99214 OFFICE O/P EST MOD 30 MIN: CPT

## 2025-03-03 PROCEDURE — G2211 COMPLEX E/M VISIT ADD ON: CPT

## 2025-04-04 ENCOUNTER — APPOINTMENT (OUTPATIENT)
Dept: VASCULAR SURGERY | Facility: CLINIC | Age: 89
End: 2025-04-04
Payer: MEDICARE

## 2025-04-04 PROCEDURE — 99203 OFFICE O/P NEW LOW 30 MIN: CPT

## 2025-05-08 ENCOUNTER — RX RENEWAL (OUTPATIENT)
Age: 89
End: 2025-05-08

## 2025-08-15 ENCOUNTER — RX RENEWAL (OUTPATIENT)
Age: 89
End: 2025-08-15